# Patient Record
Sex: MALE | Race: WHITE | NOT HISPANIC OR LATINO | Employment: FULL TIME | URBAN - METROPOLITAN AREA
[De-identification: names, ages, dates, MRNs, and addresses within clinical notes are randomized per-mention and may not be internally consistent; named-entity substitution may affect disease eponyms.]

---

## 2020-11-05 ENCOUNTER — HOSPITAL ENCOUNTER (OUTPATIENT)
Dept: URGENT CARE | Facility: CLINIC | Age: 60
Discharge: HOME | End: 2020-11-05
Attending: PHYSICIAN ASSISTANT

## 2020-11-09 LAB — SARS-COV-2 RNA SPEC QL NAA+PROBE: NOT DETECTED

## 2022-07-21 ENCOUNTER — PREP FOR SURGERY (OUTPATIENT)
Dept: OTHER | Facility: HOSPITAL | Age: 62
End: 2022-07-21

## 2022-07-21 ENCOUNTER — CLINICAL SUPPORT (OUTPATIENT)
Dept: GASTROENTEROLOGY | Facility: CLINIC | Age: 62
End: 2022-07-21

## 2022-07-21 DIAGNOSIS — Z12.11 SCREENING FOR COLON CANCER: Primary | ICD-10-CM

## 2022-07-21 RX ORDER — METFORMIN HYDROCHLORIDE 500 MG/1
1000 TABLET, EXTENDED RELEASE ORAL DAILY
COMMUNITY
Start: 2022-04-21

## 2022-07-21 RX ORDER — CETIRIZINE HYDROCHLORIDE 10 MG/1
10 TABLET ORAL DAILY
COMMUNITY

## 2022-07-21 RX ORDER — PEG-3350, SODIUM SULFATE, SODIUM CHLORIDE, POTASSIUM CHLORIDE, SODIUM ASCORBATE AND ASCORBIC ACID 7.5-2.691G
1000 KIT ORAL EVERY 12 HOURS
Qty: 1000 ML | Refills: 0 | Status: ON HOLD | OUTPATIENT
Start: 2022-07-21 | End: 2022-12-19

## 2022-07-21 NOTE — PROGRESS NOTES
Sameer Gregory  REASON FOR CALL Screening for colonoscopy   SENT IN PREP [facility]   History reviewed. No pertinent past medical history.  No Known Allergies  Past Surgical History:   Procedure Laterality Date   • HERNIA REPAIR     • VARIOCOCELE REPAIR       Social History     Socioeconomic History   • Marital status: Unknown   Tobacco Use   • Smoking status: Never Smoker   • Smokeless tobacco: Never Used   Vaping Use   • Vaping Use: Never used   Substance and Sexual Activity   • Alcohol use: Not Currently   • Drug use: Never     Types: Cocaine(coke), Marijuana, Methamphetamines     Family History   Problem Relation Age of Onset   • Diabetes Father    • Hypertension Father    • Kidney cancer Sister    • Diabetes Son        Current Outpatient Medications:   •  aspirin 81 MG chewable tablet, Chew 81 mg Daily., Disp: , Rfl:   •  cetirizine (zyrTEC) 10 MG tablet, Take 10 mg by mouth Daily., Disp: , Rfl:   •  metFORMIN ER (GLUCOPHAGE-XR) 500 MG 24 hr tablet, Take 1,000 mg by mouth Daily., Disp: , Rfl:   •  Multiple Vitamins-Minerals (MULTIVITAMIN ADULT EXTRA C PO), Take  by mouth., Disp: , Rfl:   •  NON FORMULARY, NMN - ANIT-AGING SUPPLEMENT, Disp: , Rfl:   •  Resveratrol 100 MG capsule, Take  by mouth., Disp: , Rfl:

## 2022-12-08 ENCOUNTER — TELEPHONE (OUTPATIENT)
Dept: GASTROENTEROLOGY | Facility: CLINIC | Age: 62
End: 2022-12-08

## 2022-12-19 ENCOUNTER — ANESTHESIA EVENT (OUTPATIENT)
Dept: GASTROENTEROLOGY | Facility: HOSPITAL | Age: 62
End: 2022-12-19

## 2022-12-19 ENCOUNTER — ANESTHESIA (OUTPATIENT)
Dept: GASTROENTEROLOGY | Facility: HOSPITAL | Age: 62
End: 2022-12-19

## 2022-12-19 ENCOUNTER — HOSPITAL ENCOUNTER (OUTPATIENT)
Facility: HOSPITAL | Age: 62
Setting detail: HOSPITAL OUTPATIENT SURGERY
Discharge: HOME | End: 2022-12-19
Attending: INTERNAL MEDICINE | Admitting: INTERNAL MEDICINE

## 2022-12-19 ENCOUNTER — TELEPHONE (OUTPATIENT)
Dept: GASTROENTEROLOGY | Facility: CLINIC | Age: 62
End: 2022-12-19

## 2022-12-19 VITALS
WEIGHT: 229.72 LBS | OXYGEN SATURATION: 99 % | HEART RATE: 61 BPM | DIASTOLIC BLOOD PRESSURE: 84 MMHG | BODY MASS INDEX: 32.96 KG/M2 | TEMPERATURE: 98.4 F | SYSTOLIC BLOOD PRESSURE: 118 MMHG | RESPIRATION RATE: 13 BRPM

## 2022-12-19 PROCEDURE — 25010000002 PROPOFOL 10 MG/ML EMULSION: Performed by: NURSE ANESTHETIST, CERTIFIED REGISTERED

## 2022-12-19 PROCEDURE — 45378 DIAGNOSTIC COLONOSCOPY: CPT | Performed by: INTERNAL MEDICINE

## 2022-12-19 RX ORDER — SODIUM CHLORIDE, SODIUM LACTATE, POTASSIUM CHLORIDE, CALCIUM CHLORIDE 600; 310; 30; 20 MG/100ML; MG/100ML; MG/100ML; MG/100ML
30 INJECTION, SOLUTION INTRAVENOUS CONTINUOUS
Status: DISCONTINUED | OUTPATIENT
Start: 2022-12-19 | End: 2022-12-19 | Stop reason: HOSPADM

## 2022-12-19 RX ORDER — PROPOFOL 10 MG/ML
VIAL (ML) INTRAVENOUS AS NEEDED
Status: DISCONTINUED | OUTPATIENT
Start: 2022-12-19 | End: 2022-12-19 | Stop reason: SURG

## 2022-12-19 RX ORDER — LIDOCAINE HYDROCHLORIDE 20 MG/ML
INJECTION, SOLUTION EPIDURAL; INFILTRATION; INTRACAUDAL; PERINEURAL AS NEEDED
Status: DISCONTINUED | OUTPATIENT
Start: 2022-12-19 | End: 2022-12-19 | Stop reason: SURG

## 2022-12-19 RX ORDER — SODIUM CHLORIDE, SODIUM LACTATE, POTASSIUM CHLORIDE, CALCIUM CHLORIDE 600; 310; 30; 20 MG/100ML; MG/100ML; MG/100ML; MG/100ML
INJECTION, SOLUTION INTRAVENOUS CONTINUOUS PRN
Status: DISCONTINUED | OUTPATIENT
Start: 2022-12-19 | End: 2022-12-19 | Stop reason: SURG

## 2022-12-19 RX ADMIN — PROPOFOL 100 MG: 10 INJECTION, EMULSION INTRAVENOUS at 07:57

## 2022-12-19 RX ADMIN — SODIUM CHLORIDE, POTASSIUM CHLORIDE, SODIUM LACTATE AND CALCIUM CHLORIDE 30 ML/HR: 600; 310; 30; 20 INJECTION, SOLUTION INTRAVENOUS at 07:08

## 2022-12-19 RX ADMIN — SODIUM CHLORIDE, POTASSIUM CHLORIDE, SODIUM LACTATE AND CALCIUM CHLORIDE: 600; 310; 30; 20 INJECTION, SOLUTION INTRAVENOUS at 07:55

## 2022-12-19 RX ADMIN — LIDOCAINE HYDROCHLORIDE 50 MG: 20 INJECTION, SOLUTION EPIDURAL; INFILTRATION; INTRACAUDAL; PERINEURAL at 07:57

## 2022-12-19 RX ADMIN — PROPOFOL 175 MCG/KG/MIN: 10 INJECTION, EMULSION INTRAVENOUS at 07:57

## 2022-12-19 NOTE — TELEPHONE ENCOUNTER
Please place patient in colon recall for 10 years.  Mail letter to patient PCP.  Follow-up as needed.

## 2022-12-19 NOTE — H&P
Pre Procedure History & Physical    Chief Complaint:   Screening colonscopy    Subjective     HPI:   63 yo M here for screening colonoscopy.    Past Medical History:   History reviewed. No pertinent past medical history.    Past Surgical History:  Past Surgical History:   Procedure Laterality Date   • COLONOSCOPY     • HERNIA REPAIR     • VARIOCOCELE REPAIR         Family History:  Family History   Problem Relation Age of Onset   • Diabetes Father    • Hypertension Father    • Kidney cancer Sister    • Diabetes Son        Social History:   reports that he has never smoked. He has never used smokeless tobacco. He reports that he does not currently use alcohol. He reports current drug use.    Medications:   Medications Prior to Admission   Medication Sig Dispense Refill Last Dose   • aspirin 81 MG chewable tablet Chew 81 mg Daily.   12/18/2022   • cetirizine (zyrTEC) 10 MG tablet Take 10 mg by mouth Daily.   12/18/2022   • metFORMIN ER (GLUCOPHAGE-XR) 500 MG 24 hr tablet Take 1,000 mg by mouth Daily.   12/18/2022   • Multiple Vitamins-Minerals (MULTIVITAMIN ADULT EXTRA C PO) Take  by mouth.   12/18/2022   • NON FORMULARY NMN - ANIT-AGING SUPPLEMENT   12/18/2022   • Resveratrol 100 MG capsule Take 100 mg by mouth Daily.   12/18/2022       Allergies:  Patient has no known allergies.    ROS:    Pertinent items are noted in HPI     Objective     Blood pressure 149/87, pulse 59, temperature 97.3 °F (36.3 °C), temperature source Temporal, resp. rate 18, weight 104 kg (229 lb 11.5 oz), SpO2 99 %.    Physical Exam   Constitutional: Pt is oriented to person, place, and time and well-developed, well-nourished, and in no distress.   Mouth/Throat: Oropharynx is clear and moist.   Neck: Normal range of motion.   Cardiovascular: Normal rate, regular rhythm and normal heart sounds.    Pulmonary/Chest: Effort normal and breath sounds normal.   Abdominal: Soft. Nontender  Skin: Skin is warm and dry.   Psychiatric: Mood, memory, affect  and judgment normal.     Assessment & Plan     Diagnosis:  Screening colonoscopy    Anticipated Surgical Procedure:  Colonoscopy    The risks, benefits, and alternatives of this procedure have been discussed with the patient or the responsible party- the patient understands and agrees to proceed.

## 2022-12-19 NOTE — ANESTHESIA POSTPROCEDURE EVALUATION
Patient: Sameer Gregory    Procedure Summary     Date: 12/19/22 Room / Location: [facility] ENDOSCOPY 5 / [facility] ENDOSCOPY    Anesthesia Start: 0755 Anesthesia Stop: 0821    Procedure: COLONOSCOPY FOR SCREENING Diagnosis:       Screening for colon cancer      (Screening for colon cancer [Z12.11])    Surgeons: Mai Sloan MD Provider: Isra Mata MD    Anesthesia Type: general ASA Status: 2          Anesthesia Type: general    Vitals  Vitals Value Taken Time   /76 12/19/22 0831   Temp 36.4 °C (97.6 °F) 12/19/22 0820   Pulse 64 12/19/22 0832   Resp 12 12/19/22 0825   SpO2 100 % 12/19/22 0832   Vitals shown include unvalidated device data.        Post Anesthesia Care and Evaluation    Patient location during evaluation: bedside  Patient participation: complete - patient participated  Level of consciousness: awake and alert  Pain management: adequate    Airway patency: patent  Anesthetic complications: No anesthetic complications  PONV Status: none  Cardiovascular status: acceptable  Respiratory status: acceptable  Hydration status: acceptable    Comments: An Anesthesiologist personally participated in the most demanding procedures (including induction and emergence if applicable) in the anesthesia plan, monitored the course of anesthesia administration at frequent intervals and remained physically present and available for immediate diagnosis and treatment of emergencies.

## 2022-12-19 NOTE — ANESTHESIA PREPROCEDURE EVALUATION
Anesthesia Evaluation     Patient summary reviewed and Nursing notes reviewed   no history of anesthetic complications:  NPO Solid Status: > 8 hours  NPO Liquid Status: > 2 hours           Airway   Mallampati: II  TM distance: >3 FB  Neck ROM: full  No difficulty expected  Dental      Pulmonary - negative pulmonary ROS and normal exam    breath sounds clear to auscultation  Cardiovascular - negative cardio ROS and normal exam  Exercise tolerance: good (4-7 METS)    Rhythm: regular  Rate: normal        Neuro/Psych- negative ROS  GI/Hepatic/Renal/Endo - negative ROS     Musculoskeletal (-) negative ROS    Abdominal    Substance History - negative use     OB/GYN negative ob/gyn ROS         Other - negative ROS       ROS/Med Hx Other: PAT Nursing Notes unavailable.                   Anesthesia Plan    ASA 2     general     (Total IV Anesthesia    Patient understands anesthesia not responsible for dental damage.  )  intravenous induction     Anesthetic plan, risks, benefits, and alternatives have been provided, discussed and informed consent has been obtained with: patient.  Pre-procedure education provided  Plan discussed with CRNA.        CODE STATUS:

## 2023-12-13 ENCOUNTER — OFFICE VISIT (OUTPATIENT)
Dept: FAMILY MEDICINE CLINIC | Facility: CLINIC | Age: 63
End: 2023-12-13
Payer: OTHER GOVERNMENT

## 2023-12-13 VITALS
HEART RATE: 65 BPM | SYSTOLIC BLOOD PRESSURE: 122 MMHG | OXYGEN SATURATION: 97 % | DIASTOLIC BLOOD PRESSURE: 80 MMHG | HEIGHT: 70 IN | BODY MASS INDEX: 27.63 KG/M2 | WEIGHT: 193 LBS | TEMPERATURE: 97.3 F

## 2023-12-13 DIAGNOSIS — Z00.00 ENCOUNTER FOR MEDICAL EXAMINATION TO ESTABLISH CARE: Primary | ICD-10-CM

## 2023-12-13 DIAGNOSIS — G47.33 OSA (OBSTRUCTIVE SLEEP APNEA): ICD-10-CM

## 2023-12-13 DIAGNOSIS — R09.81 NASAL CONGESTION: ICD-10-CM

## 2023-12-13 DIAGNOSIS — J30.1 ALLERGIC RHINITIS DUE TO POLLEN, UNSPECIFIED SEASONALITY: ICD-10-CM

## 2023-12-13 RX ORDER — CETIRIZINE HYDROCHLORIDE 10 MG/1
10 TABLET ORAL DAILY
Qty: 90 TABLET | Refills: 3 | Status: SHIPPED | OUTPATIENT
Start: 2023-12-13

## 2023-12-13 RX ORDER — ASPIRIN 81 MG/1
81 TABLET, CHEWABLE ORAL DAILY
Qty: 90 TABLET | Refills: 3 | Status: SHIPPED | OUTPATIENT
Start: 2023-12-13

## 2023-12-13 RX ORDER — PSEUDOEPHEDRINE HCL 30 MG
30 TABLET ORAL EVERY 6 HOURS PRN
Qty: 20 TABLET | Refills: 0 | Status: SHIPPED | OUTPATIENT
Start: 2023-12-13 | End: 2023-12-18

## 2023-12-13 NOTE — PROGRESS NOTES
"Chief Complaint  Establish Care    Subjective      History of Present Illness  Sameer Gregory is a 63 y.o. male who presents to [facility] with a past medical history of DIANELYS, slight hearing loss, he takes metformin for anti aging.  He just had labs done by his PCP on Post. He has had a colonoscopy last year December he is good for 10 years.  Pt has never smoked.  He is in good health. He takes ASA for primary prevention we discussed it is not used for primary prevention.   History reviewed. No pertinent past medical history.      Objective   Vital Signs:   Vitals:    12/13/23 1542   BP: 122/80   Pulse: 65   Temp: 97.3 °F (36.3 °C)   SpO2: 97%   Weight: 87.5 kg (193 lb)   Height: 177.8 cm (70\")     Body mass index is 27.69 kg/m².    Wt Readings from Last 3 Encounters:   12/13/23 87.5 kg (193 lb)   12/19/22 104 kg (229 lb 11.5 oz)   07/20/21 100 kg (221 lb)     BP Readings from Last 3 Encounters:   12/13/23 122/80   12/19/22 118/84   07/20/21 137/88       Health Maintenance   Topic Date Due    BMI FOLLOWUP  Never done    ZOSTER VACCINE (1 of 2) Never done    HEPATITIS C SCREENING  Never done    ANNUAL PHYSICAL  Never done    COLORECTAL CANCER SCREENING  12/19/2032    TDAP/TD VACCINES (2 - Tdap) 07/10/2033    COVID-19 Vaccine  Completed    INFLUENZA VACCINE  Completed    Pneumococcal Vaccine 0-64  Aged Out       Physical Exam       Result Review :  The following data was reviewed by: Beth Martell MD on 12/13/2023:      Procedures        Assessment and Plan   Diagnoses and all orders for this visit:    1. Encounter for medical examination to establish care (Primary)    2. DIANELYS (obstructive sleep apnea)    3. Nasal congestion  -     pseudoephedrine (SUDAFED) 30 MG tablet; Take 1 tablet by mouth Every 6 (Six) Hours As Needed for Congestion for up to 5 days.  Dispense: 20 tablet; Refill: 0    4. Allergic rhinitis due to pollen, unspecified seasonality  -     cetirizine (zyrTEC) 10 MG " tablet; Take 1 tablet by mouth Daily.  Dispense: 90 tablet; Refill: 3    Other orders  -     aspirin 81 MG chewable tablet; Chew 1 tablet Daily.  Dispense: 90 tablet; Refill: 3        BMI is >= 25 and <30. (Overweight) The following options were offered after discussion;: weight loss educational material (shared in after visit summary) and exercise counseling/recommendations         FOLLOW UP  Return in about 6 months (around 6/13/2024).  Patient was given instructions and counseling regarding his condition or for health maintenance advice. Please see specific information pulled into the AVS if appropriate.       Beth Martell MD  12/13/23  16:02 EST    CURRENT & DISCONTINUED MEDICATIONS  Current Outpatient Medications   Medication Instructions    aspirin 81 mg, Oral, Daily    cetirizine (ZYRTEC) 10 mg, Oral, Daily    metFORMIN ER (GLUCOPHAGE-XR) 1,000 mg, Oral, Daily    Multiple Vitamins-Minerals (MULTIVITAMIN ADULT EXTRA C PO) Oral    NON FORMULARY NMN - ANIT-AGING SUPPLEMENT     pseudoephedrine (SUDAFED) 30 mg, Oral, Every 6 Hours PRN    Resveratrol 100 mg, Oral, Daily       Medications Discontinued During This Encounter   Medication Reason    aspirin 81 MG chewable tablet Reorder    cetirizine (zyrTEC) 10 MG tablet Reorder

## 2025-02-18 ENCOUNTER — CLINICAL SUPPORT (OUTPATIENT)
Dept: FAMILY MEDICINE CLINIC | Facility: CLINIC | Age: 65
End: 2025-02-18
Payer: OTHER GOVERNMENT

## 2025-02-18 DIAGNOSIS — Z23 NEED FOR INFLUENZA VACCINATION: Primary | ICD-10-CM

## 2025-02-18 PROCEDURE — 90471 IMMUNIZATION ADMIN: CPT | Performed by: STUDENT IN AN ORGANIZED HEALTH CARE EDUCATION/TRAINING PROGRAM

## 2025-02-18 PROCEDURE — 90656 IIV3 VACC NO PRSV 0.5 ML IM: CPT | Performed by: STUDENT IN AN ORGANIZED HEALTH CARE EDUCATION/TRAINING PROGRAM

## 2025-03-19 ENCOUNTER — OFFICE VISIT (OUTPATIENT)
Dept: FAMILY MEDICINE CLINIC | Facility: CLINIC | Age: 65
End: 2025-03-19
Payer: OTHER GOVERNMENT

## 2025-03-19 VITALS
BODY MASS INDEX: 31.21 KG/M2 | HEIGHT: 70 IN | TEMPERATURE: 97.7 F | OXYGEN SATURATION: 99 % | SYSTOLIC BLOOD PRESSURE: 110 MMHG | DIASTOLIC BLOOD PRESSURE: 70 MMHG | HEART RATE: 65 BPM | WEIGHT: 218 LBS

## 2025-03-19 DIAGNOSIS — Z11.59 NEED FOR HEPATITIS C SCREENING TEST: ICD-10-CM

## 2025-03-19 DIAGNOSIS — Z00.00 ANNUAL PHYSICAL EXAM: Primary | ICD-10-CM

## 2025-03-19 DIAGNOSIS — J30.1 ALLERGIC RHINITIS DUE TO POLLEN, UNSPECIFIED SEASONALITY: ICD-10-CM

## 2025-03-19 DIAGNOSIS — N52.9 ERECTILE DYSFUNCTION, UNSPECIFIED ERECTILE DYSFUNCTION TYPE: ICD-10-CM

## 2025-03-19 RX ORDER — TADALAFIL 5 MG/1
5 TABLET ORAL DAILY PRN
Qty: 30 TABLET | Refills: 1 | Status: SHIPPED | OUTPATIENT
Start: 2025-03-19

## 2025-03-20 ENCOUNTER — TELEPHONE (OUTPATIENT)
Dept: FAMILY MEDICINE CLINIC | Facility: CLINIC | Age: 65
End: 2025-03-20

## 2025-03-20 NOTE — PROGRESS NOTES
"Chief Complaint  Erectile Dysfunction    Subjective          Sameer Gregory presents to [facility]  History of Present Illness      History of Present Illness  The patient is a 64-year-old male who presents today for an acute complaint of erectile dysfunction. He is accompanied by his son.    He has experienced erectile dysfunction in the past and was previously on Cialis, which he would like to restart. He reports no history of cardiac conditions, chest pain, shortness of breath, nausea, vomiting, or diarrhea.    Additionally, he is here for his annual physical examination. He has a known history of hypertension but is not currently on any antihypertensive medications. His current medication regimen includes metformin 1000 mg daily for diabetes management and aspirin for primary prevention.    MEDICATIONS  Metformin, aspirin      Current Outpatient Medications   Medication Instructions    aspirin 81 mg, Oral, Daily    cetirizine (ZYRTEC) 10 mg, Oral, Daily    metFORMIN ER (GLUCOPHAGE-XR) 1,000 mg, Daily    Multiple Vitamins-Minerals (MULTIVITAMIN ADULT EXTRA C PO) Take  by mouth.    NON FORMULARY NMN - ANIT-AGING SUPPLEMENT    Resveratrol 100 mg, Daily    tadalafil (CIALIS) 5 mg, Oral, Daily PRN       The following portions of the patient's history were reviewed and updated as appropriate: allergies, current medications, past family history, past medical history, past social history, past surgical history, and problem list.    Objective   Vital Signs:   /70   Pulse 65   Temp 97.7 °F (36.5 °C)   Ht 177.8 cm (70\")   Wt 98.9 kg (218 lb)   SpO2 99%   BMI 31.28 kg/m²     BP Readings from Last 3 Encounters:   03/19/25 110/70   12/13/23 122/80   12/19/22 118/84     Wt Readings from Last 3 Encounters:   03/19/25 98.9 kg (218 lb)   12/13/23 87.5 kg (193 lb)   12/19/22 104 kg (229 lb 11.5 oz)     BMI is >= 30 and <35. (Class 1 Obesity). The following options were offered after " discussion;: weight loss educational material (shared in after visit summary), exercise counseling/recommendations, and nutrition counseling/recommendations     Physical Exam     General:  AAO x3, no acute distress.  Eyes:  EOMI, PERRLA  Ears/Nose/Mouth/Throat:  Moist mucous membranes  Respiratory:  CTA bilaterally, no wheezes rales or rhonchi  Cardiovascular:  RRR no murmur rubs or gallops  Gastrointestinal:  Abdomen soft nondistended nontender bowel sounds present x4 quadrants  Musculoskeletal:  Moves all 4 extremities spontaneously, no apparent weakness  Skin:  Warm, dry, no skin rashes or lesions  Neurologic:  AAO x3, cranial nerves 2-12 grossly intact, no focal neuro deficits  Psychiatric:  Normal mood and affect      Result Review :   The following data was reviewed by: Beth Martell MD on 03/19/2025:           Lab Results   Component Value Date    COVID19 NOT DETECTED 11/06/2020       Procedures        Assessment and Plan    Diagnoses and all orders for this visit:    1. Annual physical exam (Primary)    2. Allergic rhinitis due to pollen, unspecified seasonality    3. Erectile dysfunction, unspecified erectile dysfunction type          Assessment & Plan  1. Erectile dysfunction.  He has a history of erectile dysfunction and was previously on Cialis. He reports no history of heart conditions, chest pain, shortness of breath, nausea, vomiting, or diarrhea. Cialis will be initiated today.    2. Hypertension.  He has a history of hypertension but is not currently on any medications.    3. Diabetes mellitus.  He is currently taking metformin 1000 mg daily.    4. Health maintenance.  He is currently taking aspirin for primary prevention.    Follow-up  The patient will follow up in 3 months.      There are no discontinued medications.       Follow Up   No follow-ups on file.  Patient was given instructions and counseling regarding his condition or for health maintenance advice. Please see specific information  pulled into the AVS if appropriate.       Beth Martell MD  03/20/25  14:34 EDT      Patient or patient representative verbalized consent for the use of Ambient Listening during the visit with  Beth Martell MD for chart documentation. 3/20/2025  14:32 EDT

## 2025-06-25 ENCOUNTER — OFFICE VISIT (OUTPATIENT)
Dept: FAMILY MEDICINE CLINIC | Facility: CLINIC | Age: 65
End: 2025-06-25
Payer: OTHER GOVERNMENT

## 2025-06-25 VITALS
HEIGHT: 70 IN | BODY MASS INDEX: 32.65 KG/M2 | TEMPERATURE: 98.3 F | SYSTOLIC BLOOD PRESSURE: 128 MMHG | OXYGEN SATURATION: 94 % | HEART RATE: 69 BPM | WEIGHT: 228.1 LBS | DIASTOLIC BLOOD PRESSURE: 84 MMHG

## 2025-06-25 DIAGNOSIS — N52.9 ERECTILE DYSFUNCTION, UNSPECIFIED ERECTILE DYSFUNCTION TYPE: ICD-10-CM

## 2025-06-25 DIAGNOSIS — R73.03 PREDIABETES: Primary | ICD-10-CM

## 2025-06-25 DIAGNOSIS — Z23 NEED FOR VACCINATION WITH 20-POLYVALENT PNEUMOCOCCAL CONJUGATE VACCINE: ICD-10-CM

## 2025-06-25 DIAGNOSIS — Z23 IMMUNIZATION DUE: ICD-10-CM

## 2025-06-25 RX ORDER — TADALAFIL 5 MG/1
5 TABLET ORAL DAILY PRN
Qty: 30 TABLET | Refills: 3 | Status: SHIPPED | OUTPATIENT
Start: 2025-06-25

## 2025-06-25 NOTE — PROGRESS NOTES
"Chief Complaint  Follow-up    Subjective          Sameer Gregory presents to [facility]  History of Present Illness      History of Present Illness  The patient presents for a health maintenance visit.    He is not a diabetic but has been utilizing a Claude device for monitoring. He has expressed interest in increasing his physical activity. He is no longer taking metformin and aspirin. He is currently on a regimen of resveratrol, which he procures independently. He is also contemplating the use of rapamycin, pending further information from [facility].    He has requested a refill of his Cialis prescription.      Current Outpatient Medications   Medication Instructions    Multiple Vitamins-Minerals (MULTIVITAMIN ADULT EXTRA C PO) Take  by mouth.    NON FORMULARY NMN - ANIT-AGING SUPPLEMENT    Resveratrol 100 mg, Daily    tadalafil (CIALIS) 5 mg, Oral, Daily PRN       The following portions of the patient's history were reviewed and updated as appropriate: allergies, current medications, past family history, past medical history, past social history, past surgical history, and problem list.    Objective   Vital Signs:   /84   Pulse 69   Temp 98.3 °F (36.8 °C) (Oral)   Ht 177.8 cm (70\")   Wt 103 kg (228 lb 1.6 oz)   SpO2 94%   BMI 32.73 kg/m²     BP Readings from Last 3 Encounters:   06/25/25 128/84   03/19/25 110/70   12/13/23 122/80     Wt Readings from Last 3 Encounters:   06/25/25 103 kg (228 lb 1.6 oz)   03/19/25 98.9 kg (218 lb)   12/13/23 87.5 kg (193 lb)      Patient has been erroneously marked as diabetic. Based on the available clinical information, he does not have diabetes and should therefore be excluded from diabetic health maintenance and quality measures for the remainder of the reporting period.       Physical Exam  Constitutional:       General: He is not in acute distress.     Appearance: Normal appearance. He is not toxic-appearing.   HENT:      Head: " Normocephalic and atraumatic.      Right Ear: Tympanic membrane normal.      Left Ear: Tympanic membrane normal.      Nose: Nose normal.      Mouth/Throat:      Mouth: Mucous membranes are moist.   Eyes:      General: No scleral icterus.     Extraocular Movements: Extraocular movements intact.      Conjunctiva/sclera: Conjunctivae normal.      Pupils: Pupils are equal, round, and reactive to light.   Cardiovascular:      Rate and Rhythm: Normal rate and regular rhythm.      Pulses: Normal pulses.      Heart sounds: Normal heart sounds. No murmur heard.     No gallop.   Pulmonary:      Effort: Pulmonary effort is normal. No respiratory distress.   Abdominal:      General: Abdomen is flat. Bowel sounds are normal. There is no distension.      Palpations: Abdomen is soft.   Musculoskeletal:         General: Normal range of motion.      Cervical back: Normal range of motion.   Skin:     General: Skin is warm and dry.      Capillary Refill: Capillary refill takes less than 2 seconds.   Neurological:      General: No focal deficit present.      Mental Status: He is alert.   Psychiatric:         Mood and Affect: Mood normal.          [unfilled]    Result Review :   The following data was reviewed by: Beth Martell MD on 06/25/2025:           Lab Results   Component Value Date    COVID19 NOT DETECTED 11/06/2020       Procedures        Assessment and Plan    Diagnoses and all orders for this visit:    1. Prediabetes (Primary)    2. Need for vaccination with 20-polyvalent pneumococcal conjugate vaccine  -     Pneumococcal Conjugate Vaccine 21-Valent All    3. Erectile dysfunction, unspecified erectile dysfunction type  -     tadalafil (Cialis) 5 MG tablet; Take 1 tablet by mouth Daily As Needed for Erectile Dysfunction.  Dispense: 30 tablet; Refill: 3          Assessment & Plan  1. Prediabetes.  - A1c level recorded at 5.7 on 06/05/2025, indicating a prediabetic state.  - Blood sugar levels to be managed with increased  exercise.    2. Health maintenance.  - Cholesterol levels within normal range: total cholesterol 163, triglycerides 140, LDL 82.  - Liver and kidney functions are excellent.  - No cholesterol medication required at this time.  - Pneumonia vaccine to be administered today.  - Urine microalbumin test to be conducted.    3. Medication management.  - No longer taking metformin and aspirin.  - Cialis refill provided with three additional refills.      Medications Discontinued During This Encounter   Medication Reason    metFORMIN ER (GLUCOPHAGE-XR) 500 MG 24 hr tablet     aspirin 81 MG chewable tablet     cetirizine (zyrTEC) 10 MG tablet     tadalafil (Cialis) 5 MG tablet Reorder          Follow Up   Return in about 3 months (around 9/25/2025).  Patient was given instructions and counseling regarding his condition or for health maintenance advice. Please see specific information pulled into the AVS if appropriate.       Beth Martell MD  06/25/25  14:44 EDT      Patient or patient representative verbalized consent for the use of Ambient Listening during the visit with  Beth Martell MD for chart documentation. 6/25/2025  14:40 EDT

## 2025-08-06 DIAGNOSIS — N52.9 ERECTILE DYSFUNCTION, UNSPECIFIED ERECTILE DYSFUNCTION TYPE: ICD-10-CM

## 2025-08-06 RX ORDER — TADALAFIL 5 MG/1
5 TABLET ORAL DAILY
Qty: 90 TABLET | Refills: 3 | Status: SHIPPED | OUTPATIENT
Start: 2025-08-06

## 2025-08-19 ENCOUNTER — TELEPHONE (OUTPATIENT)
Dept: FAMILY MEDICINE CLINIC | Facility: CLINIC | Age: 65
End: 2025-08-19
Payer: OTHER GOVERNMENT

## 2025-08-19 DIAGNOSIS — N40.1 BENIGN PROSTATIC HYPERPLASIA WITH LOWER URINARY TRACT SYMPTOMS, SYMPTOM DETAILS UNSPECIFIED: ICD-10-CM

## 2025-08-19 DIAGNOSIS — N52.9 ERECTILE DYSFUNCTION, UNSPECIFIED ERECTILE DYSFUNCTION TYPE: Primary | ICD-10-CM

## 2025-08-19 RX ORDER — TADALAFIL 5 MG/1
5 TABLET ORAL DAILY
Qty: 90 TABLET | Refills: 3 | Status: SHIPPED | OUTPATIENT
Start: 2025-08-19

## (undated) DEVICE — Device: Brand: DEFENDO AIR/WATER/SUCTION AND BIOPSY VALVE

## (undated) DEVICE — Device

## (undated) DEVICE — LINER SURG CANSTR SXN S/RIGD 1500CC

## (undated) DEVICE — SOLIDIFIER LIQLOC PLS 1500CC BT

## (undated) DEVICE — SOL IRRG H2O PL/BG 1000ML STRL

## (undated) DEVICE — CONN JET HYDRA H20 AUXILIARY DISP